# Patient Record
Sex: FEMALE | Race: WHITE | NOT HISPANIC OR LATINO | Employment: FULL TIME | URBAN - METROPOLITAN AREA
[De-identification: names, ages, dates, MRNs, and addresses within clinical notes are randomized per-mention and may not be internally consistent; named-entity substitution may affect disease eponyms.]

---

## 2021-07-23 ENCOUNTER — OFFICE VISIT (OUTPATIENT)
Dept: INTERNAL MEDICINE | Facility: CLINIC | Age: 21
End: 2021-07-23

## 2021-07-23 ENCOUNTER — LAB (OUTPATIENT)
Dept: LAB | Facility: HOSPITAL | Age: 21
End: 2021-07-23

## 2021-07-23 VITALS
OXYGEN SATURATION: 99 % | BODY MASS INDEX: 34.17 KG/M2 | WEIGHT: 181 LBS | DIASTOLIC BLOOD PRESSURE: 74 MMHG | HEART RATE: 88 BPM | HEIGHT: 61 IN | TEMPERATURE: 96.9 F | SYSTOLIC BLOOD PRESSURE: 124 MMHG

## 2021-07-23 DIAGNOSIS — Z13.29 THYROID DISORDER SCREENING: ICD-10-CM

## 2021-07-23 DIAGNOSIS — M25.562 ACUTE PAIN OF LEFT KNEE: ICD-10-CM

## 2021-07-23 DIAGNOSIS — Z13.1 SCREENING FOR DIABETES MELLITUS: ICD-10-CM

## 2021-07-23 DIAGNOSIS — Z13.0 SCREENING FOR BLOOD DISEASE: ICD-10-CM

## 2021-07-23 DIAGNOSIS — F41.8 DEPRESSION WITH ANXIETY: Primary | ICD-10-CM

## 2021-07-23 DIAGNOSIS — Z13.220 LIPID SCREENING: ICD-10-CM

## 2021-07-23 DIAGNOSIS — M23.8X2 JOINT LAXITY OF LEFT KNEE: ICD-10-CM

## 2021-07-23 DIAGNOSIS — Z13.21 ENCOUNTER FOR VITAMIN DEFICIENCY SCREENING: ICD-10-CM

## 2021-07-23 LAB
25(OH)D3 SERPL-MCNC: 34.5 NG/ML
ALBUMIN SERPL-MCNC: 4.5 G/DL (ref 3.5–5.2)
ALBUMIN/GLOB SERPL: 1.4 G/DL
ALP SERPL-CCNC: 82 U/L (ref 39–117)
ALT SERPL W P-5'-P-CCNC: 12 U/L (ref 1–33)
ANION GAP SERPL CALCULATED.3IONS-SCNC: 9.6 MMOL/L (ref 5–15)
AST SERPL-CCNC: 19 U/L (ref 1–32)
BILIRUB SERPL-MCNC: 0.2 MG/DL (ref 0–1.2)
BUN SERPL-MCNC: 7 MG/DL (ref 6–20)
BUN/CREAT SERPL: 8.6 (ref 7–25)
CALCIUM SPEC-SCNC: 9.3 MG/DL (ref 8.6–10.5)
CHLORIDE SERPL-SCNC: 104 MMOL/L (ref 98–107)
CHOLEST SERPL-MCNC: 149 MG/DL (ref 0–200)
CO2 SERPL-SCNC: 24.4 MMOL/L (ref 22–29)
CREAT SERPL-MCNC: 0.81 MG/DL (ref 0.57–1)
DEPRECATED RDW RBC AUTO: 43.3 FL (ref 37–54)
ERYTHROCYTE [DISTWIDTH] IN BLOOD BY AUTOMATED COUNT: 12.9 % (ref 12.3–15.4)
FOLATE SERPL-MCNC: 6.18 NG/ML (ref 4.78–24.2)
GFR SERPL CREATININE-BSD FRML MDRD: 88 ML/MIN/1.73
GLOBULIN UR ELPH-MCNC: 3.2 GM/DL
GLUCOSE SERPL-MCNC: 85 MG/DL (ref 65–99)
HBA1C MFR BLD: 5.14 % (ref 4.8–5.6)
HCT VFR BLD AUTO: 40.9 % (ref 34–46.6)
HDLC SERPL-MCNC: 46 MG/DL (ref 40–60)
HGB BLD-MCNC: 13.2 G/DL (ref 12–15.9)
LDLC SERPL CALC-MCNC: 88 MG/DL (ref 0–100)
LDLC/HDLC SERPL: 1.9 {RATIO}
MCH RBC QN AUTO: 29.3 PG (ref 26.6–33)
MCHC RBC AUTO-ENTMCNC: 32.3 G/DL (ref 31.5–35.7)
MCV RBC AUTO: 90.7 FL (ref 79–97)
PLATELET # BLD AUTO: 356 10*3/MM3 (ref 140–450)
PMV BLD AUTO: 10.1 FL (ref 6–12)
POTASSIUM SERPL-SCNC: 4.2 MMOL/L (ref 3.5–5.2)
PROT SERPL-MCNC: 7.7 G/DL (ref 6–8.5)
RBC # BLD AUTO: 4.51 10*6/MM3 (ref 3.77–5.28)
SODIUM SERPL-SCNC: 138 MMOL/L (ref 136–145)
TRIGL SERPL-MCNC: 78 MG/DL (ref 0–150)
TSH SERPL DL<=0.05 MIU/L-ACNC: 1.29 UIU/ML (ref 0.27–4.2)
VIT B12 BLD-MCNC: 268 PG/ML (ref 211–946)
VLDLC SERPL-MCNC: 15 MG/DL (ref 5–40)
WBC # BLD AUTO: 8.57 10*3/MM3 (ref 3.4–10.8)

## 2021-07-23 PROCEDURE — 83036 HEMOGLOBIN GLYCOSYLATED A1C: CPT | Performed by: NURSE PRACTITIONER

## 2021-07-23 PROCEDURE — 99204 OFFICE O/P NEW MOD 45 MIN: CPT | Performed by: NURSE PRACTITIONER

## 2021-07-23 PROCEDURE — 80053 COMPREHEN METABOLIC PANEL: CPT | Performed by: NURSE PRACTITIONER

## 2021-07-23 PROCEDURE — 85027 COMPLETE CBC AUTOMATED: CPT | Performed by: NURSE PRACTITIONER

## 2021-07-23 PROCEDURE — 82746 ASSAY OF FOLIC ACID SERUM: CPT | Performed by: NURSE PRACTITIONER

## 2021-07-23 PROCEDURE — 84443 ASSAY THYROID STIM HORMONE: CPT | Performed by: NURSE PRACTITIONER

## 2021-07-23 PROCEDURE — 80061 LIPID PANEL: CPT | Performed by: NURSE PRACTITIONER

## 2021-07-23 PROCEDURE — 82306 VITAMIN D 25 HYDROXY: CPT | Performed by: NURSE PRACTITIONER

## 2021-07-23 PROCEDURE — 82607 VITAMIN B-12: CPT | Performed by: NURSE PRACTITIONER

## 2021-07-23 RX ORDER — FLUOXETINE 10 MG/1
10 CAPSULE ORAL DAILY
COMMUNITY
End: 2021-09-24 | Stop reason: SDUPTHER

## 2021-07-23 RX ORDER — CARIPRAZINE 4.5 MG/1
CAPSULE, GELATIN COATED ORAL
COMMUNITY
End: 2021-09-24 | Stop reason: SDUPTHER

## 2021-07-23 RX ORDER — DICLOFENAC SODIUM 75 MG/1
75 TABLET, DELAYED RELEASE ORAL 2 TIMES DAILY PRN
Qty: 60 TABLET | Refills: 5 | Status: SHIPPED | OUTPATIENT
Start: 2021-07-23 | End: 2021-09-24

## 2021-07-28 ENCOUNTER — HOSPITAL ENCOUNTER (OUTPATIENT)
Dept: GENERAL RADIOLOGY | Facility: HOSPITAL | Age: 21
Discharge: HOME | End: 2021-07-28
Admitting: NURSE PRACTITIONER

## 2021-07-28 DIAGNOSIS — M23.8X2 JOINT LAXITY OF LEFT KNEE: ICD-10-CM

## 2021-07-28 PROCEDURE — 73560 X-RAY EXAM OF KNEE 1 OR 2: CPT

## 2021-07-29 PROBLEM — E53.8 B12 DEFICIENCY: Status: ACTIVE | Noted: 2021-07-29

## 2021-07-29 NOTE — PROGRESS NOTES
My chart message:    Xray shows a small bit of effusion within the joint that is caused by irritation. There are no fractures or arthritis. Joint is in good alignment. Options are I can order physical therapy to see if this will improve your knee. I can also try to get an MRI through insurance to further look at cartilage, ligaments, and tendons then refer you onto specialist if needed. Let me know which one you want to try.

## 2021-08-06 ENCOUNTER — APPOINTMENT (OUTPATIENT)
Dept: GENERAL RADIOLOGY | Facility: HOSPITAL | Age: 21
End: 2021-08-06

## 2021-08-06 ENCOUNTER — HOSPITAL ENCOUNTER (EMERGENCY)
Facility: HOSPITAL | Age: 21
Discharge: HOME | End: 2021-08-06
Attending: EMERGENCY MEDICINE | Admitting: EMERGENCY MEDICINE

## 2021-08-06 VITALS
WEIGHT: 180 LBS | SYSTOLIC BLOOD PRESSURE: 119 MMHG | RESPIRATION RATE: 18 BRPM | HEIGHT: 61 IN | TEMPERATURE: 98 F | DIASTOLIC BLOOD PRESSURE: 85 MMHG | OXYGEN SATURATION: 100 % | HEART RATE: 79 BPM | BODY MASS INDEX: 33.99 KG/M2

## 2021-08-06 DIAGNOSIS — M22.2X1 PATELLOFEMORAL SYNDROME OF BOTH KNEES: Primary | ICD-10-CM

## 2021-08-06 DIAGNOSIS — M54.32 SCIATICA OF LEFT SIDE: ICD-10-CM

## 2021-08-06 DIAGNOSIS — M22.2X2 PATELLOFEMORAL SYNDROME OF BOTH KNEES: Primary | ICD-10-CM

## 2021-08-06 LAB
B-HCG UR QL: NEGATIVE
INTERNAL NEGATIVE CONTROL: NEGATIVE
INTERNAL POSITIVE CONTROL: NORMAL
Lab: NORMAL

## 2021-08-06 PROCEDURE — 81025 URINE PREGNANCY TEST: CPT | Performed by: PHYSICIAN ASSISTANT

## 2021-08-06 PROCEDURE — 73560 X-RAY EXAM OF KNEE 1 OR 2: CPT

## 2021-08-06 PROCEDURE — 99283 EMERGENCY DEPT VISIT LOW MDM: CPT

## 2021-08-07 NOTE — ED PROVIDER NOTES
Subjective   20-year-old female presents emergency brought today with right knee pain.  She is a G1 saw her family physician and was complaining of having bilateral knee pain.  She states the left knee had been x-rayed about a week ago the right knee is now tender is tender mostly on the joint line both medially and laterally.  Also has some tenderness under the patella.  She has cracking and popping with flexion and extension.  No trauma.  Also complaining of some pain into the left buttock down into the left leg.  This did not start for couple weeks after the right knee started hurting.      History provided by:  Patient   used: No    Knee Pain  Location:  Knee  Injury: no    Knee location:  R knee  Pain details:     Quality:  Sharp    Radiates to:  Does not radiate    Severity:  Moderate    Onset quality:  Gradual    Duration:  2 weeks    Timing:  Intermittent    Progression:  Waxing and waning  Chronicity:  Recurrent  Dislocation: no    Foreign body present:  No foreign bodies  Tetanus status:  Up to date  Prior injury to area:  No  Relieved by: Brace and immobilization.  Exacerbated by: Weightbearing flexing and's extension.  Associated symptoms: no back pain, no decreased ROM, no fever, no itching, no neck pain, no numbness and no stiffness    Risk factors: no concern for non-accidental trauma        Review of Systems   Constitutional: Negative for chills and fever.   Respiratory: Negative for chest tightness, shortness of breath and wheezing.    Cardiovascular: Negative for chest pain and palpitations.   Gastrointestinal: Negative for diarrhea and nausea.   Genitourinary: Negative for dysuria and frequency.   Musculoskeletal: Negative for back pain, neck pain and stiffness.   Skin: Negative for itching, pallor and rash.   Psychiatric/Behavioral: Negative.    All other systems reviewed and are negative.      Past Medical History:   Diagnosis Date   • Anorexia    • Avoidant and restrictive  food intake disorder    • Bipolar 1 disorder, mixed, severe (CMS/HCC) 2019   • H/O MTHFR mutation    • PTSD (post-traumatic stress disorder) 2018       Allergies   Allergen Reactions   • Lexapro [Escitalopram] Hallucinations   • Zoloft [Sertraline] Other (See Comments)     Makes her severally maniac     • Keflex [Cephalexin] Rash       Past Surgical History:   Procedure Laterality Date   • COLONOSCOPY         Family History   Problem Relation Age of Onset   • Diabetes Other    • Breast cancer Other    • Mental illness Mother    • Melanoma Maternal Grandmother    • Liver cancer Maternal Grandfather    • Colon cancer Maternal Grandfather        Social History     Socioeconomic History   • Marital status: Single     Spouse name: Not on file   • Number of children: Not on file   • Years of education: Not on file   • Highest education level: Not on file   Tobacco Use   • Smoking status: Current Every Day Smoker     Packs/day: 0.50   • Smokeless tobacco: Never Used   Substance and Sexual Activity   • Drug use: Never   • Sexual activity: Defer           Objective   Physical Exam  Vitals and nursing note reviewed.   Constitutional:       Appearance: She is well-developed.   HENT:      Head: Normocephalic and atraumatic.      Right Ear: External ear normal.      Left Ear: External ear normal.      Nose: Nose normal.   Eyes:      General: No scleral icterus.     Conjunctiva/sclera: Conjunctivae normal.      Pupils: Pupils are equal, round, and reactive to light.   Neck:      Thyroid: No thyromegaly.   Cardiovascular:      Rate and Rhythm: Normal rate and regular rhythm.      Heart sounds: Normal heart sounds.   Pulmonary:      Effort: Pulmonary effort is normal. No respiratory distress.      Breath sounds: Normal breath sounds. No wheezing or rales.   Chest:      Chest wall: No tenderness.   Abdominal:      General: Bowel sounds are normal. There is no distension.      Palpations: Abdomen is soft.      Tenderness: There is no  "abdominal tenderness.   Musculoskeletal:      Cervical back: Normal range of motion.   Lymphadenopathy:      Cervical: No cervical adenopathy.   Skin:     General: Skin is warm and dry.   Neurological:      Mental Status: She is alert and oriented to person, place, and time.      Cranial Nerves: No cranial nerve deficit.      Coordination: Coordination normal.      Deep Tendon Reflexes: Reflexes are normal and symmetric. Reflexes normal.   Psychiatric:         Behavior: Behavior normal.         Thought Content: Thought content normal.         Judgment: Judgment normal.         Procedures           ED Course                                 No results found for this or any previous visit (from the past 24 hour(s)).  Note: In addition to lab results from this visit, the labs listed above may include labs taken at another facility or during a different encounter within the last 24 hours. Please correlate lab times with ED admission and discharge times for further clarification of the services performed during this visit.    XR Knee 1 or 2 View Right   Final Result   Negative right knee.      Signer Name: Angela Rodríguez MD    Signed: 8/6/2021 8:53 PM    Workstation Name: [id number]     [facility]        Vitals:    08/06/21 1851   BP: 119/85   BP Location: Left arm   Patient Position: Sitting   Pulse: 79   Resp: 18   Temp: 98 °F (36.7 °C)   TempSrc: Oral   SpO2: 100%   Weight: 81.6 kg (180 lb)   Height: 154.9 cm (61\")     Medications - No data to display  ECG/EMG Results (last 24 hours)     ** No results found for the last 24 hours. **        No orders to display                 MDM  Number of Diagnoses or Management Options  Patellofemoral syndrome of both knees: new and requires workup  Sciatica of left side: new and requires workup     Amount and/or Complexity of Data Reviewed  Tests in the radiology section of CPT®: reviewed and ordered  Discuss the patient with other providers: yes    Patient " Progress  Patient progress: stable      Final diagnoses:   Patellofemoral syndrome of both knees   Sciatica of left side       ED Disposition  ED Disposition     ED Disposition Condition Comment    Discharge Stable           Antonio Harmon MD  [address] [place] [address]  [phone]               Medication List      No changes were made to your prescriptions during this visit.          Luis Alberto Palmer PA  08/09/21 5169

## 2021-08-08 PROBLEM — M23.8X2 JOINT LAXITY OF LEFT KNEE: Status: ACTIVE | Noted: 2021-08-08

## 2021-09-24 ENCOUNTER — OFFICE VISIT (OUTPATIENT)
Dept: INTERNAL MEDICINE | Facility: CLINIC | Age: 21
End: 2021-09-24

## 2021-09-24 VITALS
HEIGHT: 61 IN | OXYGEN SATURATION: 96 % | DIASTOLIC BLOOD PRESSURE: 76 MMHG | TEMPERATURE: 96.4 F | WEIGHT: 180 LBS | BODY MASS INDEX: 33.99 KG/M2 | HEART RATE: 83 BPM | RESPIRATION RATE: 16 BRPM | SYSTOLIC BLOOD PRESSURE: 122 MMHG

## 2021-09-24 DIAGNOSIS — M54.42 CHRONIC BILATERAL LOW BACK PAIN WITH BILATERAL SCIATICA: ICD-10-CM

## 2021-09-24 DIAGNOSIS — L91.8 SKIN TAG: ICD-10-CM

## 2021-09-24 DIAGNOSIS — G89.29 CHRONIC BILATERAL LOW BACK PAIN WITH BILATERAL SCIATICA: ICD-10-CM

## 2021-09-24 DIAGNOSIS — L02.91 ABSCESS: ICD-10-CM

## 2021-09-24 DIAGNOSIS — L73.9 FOLLICULITIS: ICD-10-CM

## 2021-09-24 DIAGNOSIS — M54.41 CHRONIC BILATERAL LOW BACK PAIN WITH BILATERAL SCIATICA: ICD-10-CM

## 2021-09-24 DIAGNOSIS — F41.8 DEPRESSION WITH ANXIETY: Primary | ICD-10-CM

## 2021-09-24 PROCEDURE — 99214 OFFICE O/P EST MOD 30 MIN: CPT | Performed by: NURSE PRACTITIONER

## 2021-09-24 RX ORDER — FLUOXETINE 10 MG/1
10 CAPSULE ORAL DAILY
Qty: 90 CAPSULE | Refills: 1 | Status: SHIPPED | OUTPATIENT
Start: 2021-09-24

## 2021-09-24 RX ORDER — METHOCARBAMOL 750 MG/1
TABLET, FILM COATED ORAL
Qty: 60 TABLET | Refills: 2 | Status: SHIPPED | OUTPATIENT
Start: 2021-09-24 | End: 2021-10-25

## 2021-09-24 RX ORDER — BENZOYL PEROXIDE 100 MG/ML
LIQUID TOPICAL
Qty: 227 G | Refills: 1 | Status: SHIPPED | OUTPATIENT
Start: 2021-09-24 | End: 2021-10-25

## 2021-09-24 RX ORDER — SULFAMETHOXAZOLE AND TRIMETHOPRIM 800; 160 MG/1; MG/1
1 TABLET ORAL 2 TIMES DAILY
Qty: 20 TABLET | Refills: 0 | Status: SHIPPED | OUTPATIENT
Start: 2021-09-24 | End: 2021-10-04

## 2021-09-24 RX ORDER — DICLOFENAC SODIUM 75 MG/1
75 TABLET, DELAYED RELEASE ORAL 2 TIMES DAILY PRN
Qty: 60 TABLET | Refills: 5 | Status: SHIPPED | OUTPATIENT
Start: 2021-09-24 | End: 2021-10-25

## 2021-09-24 RX ORDER — FLUCONAZOLE 150 MG/1
150 TABLET ORAL ONCE
Qty: 1 TABLET | Refills: 0 | Status: SHIPPED | OUTPATIENT
Start: 2021-09-24 | End: 2021-09-24

## 2021-09-24 RX ORDER — CARIPRAZINE 4.5 MG/1
4.5 CAPSULE, GELATIN COATED ORAL DAILY
Qty: 90 CAPSULE | Refills: 1 | Status: SHIPPED | OUTPATIENT
Start: 2021-09-24

## 2021-09-24 NOTE — PROGRESS NOTES
Subjective   Chief Complaint   Patient presents with   • Abscess     pelvic   • Pain      Dionte Londono is a 20 y.o. female here today for depression, anxiety, abscess, and back pain. Depression and anxiety had been stable and doing well with prozac and vraylar. She is in the transition of switching psychiatrists and has not meet new one yet. Medications were not refilled by previous psychiatrist and she ran out of meds. She's had significant mood instability with increased depression and anxiety. No thoughts of self harm or harming others. She frequently gets ingrown hair like abscesses in the groin area. She has small abscess with multiple areas that are swollen. She has tried clindamycin gel in the past with no improvement. Has 2 skin tags on labia majora that have grown in size and she would like consult with dermatology to have these removed. Has chronic bilateral knee pain and seen at  in August. Left knee xray showed small effusion but right knee xray was normal.  provider felt her pain was more sciatica related. She reports bilateral low back pain with pain radiating into hips and legs. Was prescribed diclofenac oral and gel by  but did not start these.       I have reviewed the following portions of the patient's history and confirmed they are accurate: allergies, current medications, past family history, past medical history, past social history, past surgical history and problem list    I have personally completed the patient's review of systems.    Review of Systems   Constitutional: Negative for activity change, appetite change, chills, diaphoresis, fatigue, fever, unexpected weight gain and unexpected weight loss.   HENT: Negative for ear discharge, ear pain, mouth sores, nosebleeds, sinus pressure, sneezing and sore throat.    Eyes: Negative for pain, discharge and itching.   Respiratory: Negative for cough, chest tightness, shortness of breath and wheezing.    Cardiovascular: Negative for  "chest pain, palpitations and leg swelling.   Gastrointestinal: Negative for abdominal pain, constipation, diarrhea, nausea and vomiting.   Endocrine: Negative for heat intolerance, polydipsia and polyphagia.   Genitourinary: Negative for dysuria, flank pain, frequency, hematuria and urgency.   Musculoskeletal: Positive for arthralgias, back pain and myalgias. Negative for gait problem, joint swelling, neck pain and neck stiffness.   Skin: Positive for skin lesions. Negative for color change, pallor and rash.        abscess   Allergic/Immunologic: Negative for immunocompromised state.   Neurological: Negative for seizures, speech difficulty, weakness and numbness.   Hematological: Negative for adenopathy.   Psychiatric/Behavioral: Positive for depressed mood. Negative for agitation, decreased concentration, sleep disturbance and suicidal ideas. The patient is nervous/anxious.        Current Outpatient Medications on File Prior to Visit   Medication Sig   • L-Methylfolate (DEPLIN PO) Take  by mouth.   • [DISCONTINUED] Cariprazine HCl (Vraylar) 4.5 MG capsule Take  by mouth.   • [DISCONTINUED] Diclofenac Sodium (VOLTAREN) 1 % gel gel Apply 1 gram topically to indicated area 4 times daily as needed for mild to moderate pain.   • [DISCONTINUED] diclofenac (VOLTAREN) 75 MG EC tablet Take 1 tablet by mouth 2 (Two) Times a Day As Needed (moderate pain). Take with food.   • [DISCONTINUED] FLUoxetine (PROzac) 10 MG capsule Take 10 mg by mouth Daily.     No current facility-administered medications on file prior to visit.       Objective   Vitals:    09/24/21 0906   BP: 122/76   Pulse: 83   Resp: 16   Temp: 96.4 °F (35.8 °C)   TempSrc: Temporal   SpO2: 96%   Weight: 81.6 kg (180 lb)   Height: 154.9 cm (61\")     Body mass index is 34.01 kg/m².    Physical Exam  Vitals reviewed.   Constitutional:       Appearance: Normal appearance. She is well-developed.   HENT:      Head: Normocephalic and atraumatic.      Nose: Nose normal. "   Eyes:      General: Lids are normal.      Conjunctiva/sclera: Conjunctivae normal.      Pupils: Pupils are equal, round, and reactive to light.   Neck:      Thyroid: No thyromegaly.      Trachea: Trachea normal.   Pulmonary:      Effort: Pulmonary effort is normal. No respiratory distress.   Musculoskeletal:      Lumbar back: Spasms and tenderness present.      Right knee: Tenderness present.      Left knee: Tenderness present.   Skin:     General: Skin is warm and dry.      Comments: Skin tags x2 on right labia majora. Abscess of left upper inner thigh - red, firm, and tender. No drainage. Multiple other areas on right upper inner thigh with folliculitis.    Neurological:      Mental Status: She is alert and oriented to person, place, and time.      GCS: GCS eye subscore is 4. GCS verbal subscore is 5. GCS motor subscore is 6.   Psychiatric:         Attention and Perception: Attention normal.         Mood and Affect: Mood normal.         Speech: Speech normal.         Behavior: Behavior normal. Behavior is cooperative.         Assessment/Plan   Problem List Items Addressed This Visit     None      Visit Diagnoses     Depression with anxiety    -  Primary  Chronic issue unstable requiring medication management and monitoring. Will eat well balanced diet, increase water intake, increase physical activity during the day, and get adequate rest. Discussed relaxation and coping skills and exercises.   Start prozac and vraylar. Keep upcoming appt with psychiatrist.     Relevant Medications    FLUoxetine (PROzac) 10 MG capsule    Cariprazine HCl (Vraylar) 4.5 MG capsule    Abscess      Recurrent condition requiring medication management and monitoring. Will eat well balanced heart healthy diet high in protein, drink adequate water, increase physical activity as tolerated, and get adequate rest. Will monitor area for worsening of infection. Was educated on signs and symptoms of infection and when to follow up or go to ER.  Can apply warmth to this area to for pain and to increase circulation and facilitate healing. Discussed hygiene and keeping area clean. Discussed keep this covered with guaze if begins to have drainage.   Start bactrim and benzoyl peroxide wash.     Relevant Medications    sulfamethoxazole-trimethoprim (Bactrim DS) 800-160 MG per tablet    Benzoyl Peroxide (benzoyl peroxide) 10 % liquid    Skin tag      Chronic issue unstable requiring referral to dermatology.     Relevant Medications    Other Relevant Orders    Ambulatory Referral to Dermatology (Completed)    Folliculitis      Recurrent condition requiring medication management and monitoring. Will eat well balanced heart healthy diet high in protein, drink adequate water, increase physical activity as tolerated, and get adequate rest. Will monitor area for worsening of infection. Was educated on signs and symptoms of infection and when to follow up or go to ER. Can apply warmth to this area to for pain and to increase circulation and facilitate healing. Discussed hygiene and keeping area clean. Discussed keep this covered with guaze if begins to have drainage.   Start bactrim and benzoyl peroxide wash.     Relevant Medications    sulfamethoxazole-trimethoprim (Bactrim DS) 800-160 MG per tablet    Benzoyl Peroxide (benzoyl peroxide) 10 % liquid      Chronic bilateral low back pain with bilateral sciatica      Chronic issue unstable requiring medication management and monitoring. Will eat well balanced diet, increase water intake, increase physical activity as tolerated, and get adequate rest. Can use warmth or ice for discomfort in this area. Discussed stretching exercises.   Start voltaren oral and gel Prn. Start robaxin Prn.     Relevant Medications    diclofenac (VOLTAREN) 75 MG EC tablet    Diclofenac Sodium (VOLTAREN) 1 % gel gel    methocarbamol (ROBAXIN) 750 MG tablet             Current Outpatient Medications:   •  Cariprazine HCl (Vraylar) 4.5 MG capsule,  Take 4.5 mg by mouth Daily., Disp: 90 capsule, Rfl: 1  •  Diclofenac Sodium (VOLTAREN) 1 % gel gel, Apply 1 gram topically to indicated area 4 times daily as needed for mild to moderate pain., Disp: 100 g, Rfl: 2  •  L-Methylfolate (DEPLIN PO), Take  by mouth., Disp: , Rfl:   •  Benzoyl Peroxide (benzoyl peroxide) 10 % liquid, Apply topically to rash (wash and rinse) twice daily as needed., Disp: 227 g, Rfl: 1  •  diclofenac (VOLTAREN) 75 MG EC tablet, Take 1 tablet by mouth 2 (Two) Times a Day As Needed (moderate pain). Take with food., Disp: 60 tablet, Rfl: 5  •  fluconazole (Diflucan) 150 MG tablet, Take 1 tablet by mouth 1 (One) Time for 1 dose., Disp: 1 tablet, Rfl: 0  •  FLUoxetine (PROzac) 10 MG capsule, Take 1 capsule by mouth Daily., Disp: 90 capsule, Rfl: 1  •  methocarbamol (ROBAXIN) 750 MG tablet, Take 1/2-1 tablet by mouth 4 times daily as needed for muscle spasms., Disp: 60 tablet, Rfl: 2  •  sulfamethoxazole-trimethoprim (Bactrim DS) 800-160 MG per tablet, Take 1 tablet by mouth 2 (Two) Times a Day for 10 days., Disp: 20 tablet, Rfl: 0       Plan of care reviewed with the patient at the conclusion of today's visit.  Education was provided regarding diagnosis, management, and any prescribed or recommended OTC medications.  Patient verbalized understanding of and agreement with management plan.     Return in 3 months (on 12/24/2021), or if symptoms worsen or fail to improve.      JOHNNY Small    Please note that portions of this note were completed with a voice recognition program. Efforts were made to edit the dictations, but occasionally words are mistranscribed.

## 2021-12-14 ENCOUNTER — OFFICE VISIT (OUTPATIENT)
Dept: INTERNAL MEDICINE | Facility: CLINIC | Age: 21
End: 2021-12-14

## 2021-12-14 ENCOUNTER — LAB (OUTPATIENT)
Dept: LAB | Facility: HOSPITAL | Age: 21
End: 2021-12-14

## 2021-12-14 VITALS
BODY MASS INDEX: 33.61 KG/M2 | WEIGHT: 178 LBS | TEMPERATURE: 97.3 F | RESPIRATION RATE: 18 BRPM | HEIGHT: 61 IN | DIASTOLIC BLOOD PRESSURE: 70 MMHG | OXYGEN SATURATION: 99 % | SYSTOLIC BLOOD PRESSURE: 112 MMHG

## 2021-12-14 DIAGNOSIS — R79.89 PROLACTIN INCREASED: ICD-10-CM

## 2021-12-14 DIAGNOSIS — R32 URINARY INCONTINENCE, UNSPECIFIED TYPE: ICD-10-CM

## 2021-12-14 DIAGNOSIS — R10.84 GENERALIZED ABDOMINAL PAIN: ICD-10-CM

## 2021-12-14 DIAGNOSIS — R39.9 URINARY SYMPTOM OR SIGN: Primary | ICD-10-CM

## 2021-12-14 DIAGNOSIS — E28.2 PCOS (POLYCYSTIC OVARIAN SYNDROME): Primary | ICD-10-CM

## 2021-12-14 LAB
BILIRUB BLD-MCNC: NEGATIVE MG/DL
CLARITY, POC: CLEAR
COLOR UR: YELLOW
EXPIRATION DATE: NORMAL
GLUCOSE UR STRIP-MCNC: NEGATIVE MG/DL
KETONES UR QL: NEGATIVE
LEUKOCYTE EST, POC: NEGATIVE
Lab: NORMAL
NITRITE UR-MCNC: NEGATIVE MG/ML
PH UR: 6 [PH] (ref 5–8)
PROT UR STRIP-MCNC: NEGATIVE MG/DL
RBC # UR STRIP: NEGATIVE /UL
SP GR UR: 1.02 (ref 1–1.03)
UROBILINOGEN UR QL: NORMAL

## 2021-12-14 PROCEDURE — 87086 URINE CULTURE/COLONY COUNT: CPT

## 2021-12-14 PROCEDURE — 81003 URINALYSIS AUTO W/O SCOPE: CPT | Performed by: PHYSICIAN ASSISTANT

## 2021-12-14 PROCEDURE — 99214 OFFICE O/P EST MOD 30 MIN: CPT | Performed by: PHYSICIAN ASSISTANT

## 2021-12-14 RX ORDER — SPIRONOLACTONE 25 MG/1
25 TABLET ORAL DAILY
COMMUNITY

## 2021-12-14 NOTE — PROGRESS NOTES
[facility]  [address]  [facility] [address]  Dept: [phone]  Dept Fax: [phone]  Loc: [phone]  Loc Fax: [phone]    Dionte Londono  2000    Follow Up Office Visit Note    History of Present Illness:  Patient is a 21-year-old female in today for urinary incontinence, increased prolactin level, and PCOS.  Patient requesting referral to urology for increased urinary incontinence.  Patient states that she feels like she needs to go and has to get to the restroom within 10 seconds otherwise she might urinate on herself.  Patient states this is been getting worse over the last several months.  Patient would also like checked for diabetes with regard to this today.    Patient was diagnosed with increased prolactin level by her last OB/GYN where she lived in [place].  Patient has since moved here and states that her last OB/GYN said that she would need a referral to endocrinology for this.  Patient requesting referral today.    Patient with a history of PCOS has seen OB/GYN already in [facility] but would like a different opinion.  Patient requesting another referral to OB/GYN again today.    Patient also states over the last 3 months that when she eats she gets nausea and diarrhea immediately after eating.  Patient also has some generalized abdominal pain and discomfort.      The following portions of the patient's history were reviewed and updated as appropriate: allergies, current medications, past family history, past medical history, past social history, past surgical history, and problem list.    Medications:    Current Outpatient Medications:   •  Cariprazine HCl (Vraylar) 4.5 MG capsule, Take 4.5 mg by mouth Daily., Disp: 90 capsule, Rfl: 1  •  Diclofenac Sodium (VOLTAREN) 1 % gel gel, Apply 1 gram topically to indicated area 4 times daily as needed for mild to moderate pain., Disp: 100 g, Rfl: 2  •  FLUoxetine (PROzac) 10 MG capsule,  Take 1 capsule by mouth Daily., Disp: 90 capsule, Rfl: 1  •  NON FORMULARY, Test. blocker, Disp: , Rfl:   •  spironolactone (ALDACTONE) 25 MG tablet, Take 25 mg by mouth Daily., Disp: , Rfl:     Subjective  Allergies   Allergen Reactions   • Lexapro [Escitalopram] Hallucinations   • Zoloft [Sertraline] Other (See Comments)     Makes her severally maniac     • Keflex [Cephalexin] Rash        Past Medical History:   Diagnosis Date   • Anorexia    • Avoidant and restrictive food intake disorder    • Bipolar 1 disorder, mixed, severe (HCC) 2019   • H/O MTHFR mutation    • PTSD (post-traumatic stress disorder) 2018       Past Surgical History:   Procedure Laterality Date   • COLONOSCOPY         Family History   Problem Relation Age of Onset   • Diabetes Other    • Breast cancer Other    • Mental illness Mother    • Melanoma Maternal Grandmother    • Liver cancer Maternal Grandfather    • Colon cancer Maternal Grandfather         Social History     Socioeconomic History   • Marital status: Single   Tobacco Use   • Smoking status: Current Every Day Smoker     Packs/day: 0.50   • Smokeless tobacco: Never Used   Vaping Use   • Vaping Use: Every day   • Substances: Nicotine   Substance and Sexual Activity   • Drug use: Never   • Sexual activity: Defer       Review of Systems   Constitutional: Negative for activity change, chills, fatigue, fever and unexpected weight change.   HENT: Negative for congestion, ear pain, postnasal drip, sinus pressure and sore throat.    Eyes: Negative for pain, discharge and redness.   Respiratory: Negative for cough, shortness of breath and wheezing.    Cardiovascular: Negative for chest pain, palpitations and leg swelling.   Gastrointestinal: Positive for abdominal pain, diarrhea and nausea. Negative for vomiting.   Endocrine: Negative for cold intolerance and heat intolerance.   Genitourinary: Positive for urgency. Negative for decreased urine volume and dysuria.   Musculoskeletal: Negative  "for arthralgias and myalgias.   Skin: Negative for rash and wound.   Neurological: Negative for dizziness, light-headedness and headaches.   Hematological: Does not bruise/bleed easily.   Psychiatric/Behavioral: Negative for confusion, dysphoric mood and sleep disturbance. The patient is not nervous/anxious.          Objective  Vitals:    12/14/21 0907   BP: 112/70   Resp: 18   Temp: 97.3 °F (36.3 °C)   TempSrc: Temporal   SpO2: 99%   Weight: 80.7 kg (178 lb)   Height: 154.9 cm (61\")     Body mass index is 33.63 kg/m².     Physical Exam  Physical Exam  Vitals and nursing note reviewed.   Constitutional:       General: She is not in acute distress.     Appearance: She is not ill-appearing.   HENT:      Head: Normocephalic.      Right Ear: Tympanic membrane, ear canal and external ear normal. There is no impacted cerumen.      Left Ear: Tympanic membrane, ear canal and external ear normal. There is no impacted cerumen.      Nose: No congestion or rhinorrhea.      Mouth/Throat:      Mouth: Mucous membranes are moist.      Pharynx: Oropharynx is clear. No oropharyngeal exudate or posterior oropharyngeal erythema.   Eyes:      General:         Right eye: No discharge.         Left eye: No discharge.      Extraocular Movements: Extraocular movements intact.      Conjunctiva/sclera: Conjunctivae normal.      Pupils: Pupils are equal, round, and reactive to light.   Cardiovascular:      Rate and Rhythm: Normal rate and regular rhythm.      Heart sounds: Normal heart sounds. No murmur heard.  No friction rub. No gallop.    Pulmonary:      Effort: Pulmonary effort is normal. No respiratory distress.      Breath sounds: Normal breath sounds. No wheezing.   Abdominal:      General: Bowel sounds are normal. There is no distension.      Palpations: Abdomen is soft. There is no mass.      Tenderness: There is abdominal tenderness (Positive Blakely sign on exam.).   Musculoskeletal:         General: No swelling. Normal range of " motion.      Cervical back: Normal range of motion. No tenderness.      Right lower leg: No edema.      Left lower leg: No edema.   Lymphadenopathy:      Cervical: No cervical adenopathy.   Skin:     Findings: No bruising, erythema or rash.   Neurological:      Mental Status: She is oriented to person, place, and time.      Gait: Gait normal.   Psychiatric:         Mood and Affect: Mood normal.         Behavior: Behavior normal.         Thought Content: Thought content normal.         Judgment: Judgment normal.         Diagnostic Data  Procedures    Assessment  Diagnoses and all orders for this visit:    1. PCOS (polycystic ovarian syndrome) (Primary)  -     Ambulatory Referral to Obstetrics / Gynecology    2. Prolactin increased  -     Ambulatory Referral to Endocrinology    3. Urinary incontinence, unspecified type  -     Ambulatory Referral to Urology  -     POC Glycosylated Hemoglobin (Hb A1C)    4. Generalized abdominal pain  -     US Gallbladder; Future  -     NM HIDA scan with pharmacological intervention; Future        Plan    1. PCOS (polycystic ovarian syndrome) (Primary)- referred to OB/GYN.    2. Prolactin increased- referred to endocrinology.    3. Urinary incontinence, unspecified type- worse, A1c 5.3 % in office today, referred to urology.    4. Generalized abdominal pain- obtain HIDA scan and ultrasound gallbladder.        Return in about 4 weeks (around 1/11/2022) for carissa Mays.    Frantz Jaramillo PA-C  12/14/2021

## 2021-12-17 LAB
BACTERIA UR CULT: NORMAL
BACTERIA UR CULT: NORMAL